# Patient Record
Sex: MALE | Race: WHITE | NOT HISPANIC OR LATINO | Employment: UNEMPLOYED | URBAN - METROPOLITAN AREA
[De-identification: names, ages, dates, MRNs, and addresses within clinical notes are randomized per-mention and may not be internally consistent; named-entity substitution may affect disease eponyms.]

---

## 2022-11-11 ENCOUNTER — HOSPITAL ENCOUNTER (EMERGENCY)
Facility: HOSPITAL | Age: 62
Discharge: HOME | End: 2022-11-11
Attending: SURGERY

## 2022-11-11 VITALS
HEART RATE: 64 BPM | RESPIRATION RATE: 18 BRPM | SYSTOLIC BLOOD PRESSURE: 180 MMHG | TEMPERATURE: 97 F | DIASTOLIC BLOOD PRESSURE: 99 MMHG | WEIGHT: 158.75 LBS | OXYGEN SATURATION: 99 %

## 2022-11-11 DIAGNOSIS — M79.644 FINGER PAIN, RIGHT: ICD-10-CM

## 2022-11-11 DIAGNOSIS — I10 PRIMARY HYPERTENSION: ICD-10-CM

## 2022-11-11 DIAGNOSIS — I73.01 RAYNAUD'S PHENOMENON WITH GANGRENE: Primary | ICD-10-CM

## 2022-11-11 DIAGNOSIS — M79.641 HAND PAIN, RIGHT: ICD-10-CM

## 2022-11-11 LAB
ALBUMIN SERPL BCP-MCNC: 3.7 G/DL (ref 3.5–5.2)
ALP SERPL-CCNC: 64 U/L (ref 55–135)
ALT SERPL W/O P-5'-P-CCNC: 10 U/L (ref 10–44)
ANION GAP SERPL CALC-SCNC: 8 MMOL/L (ref 8–16)
AST SERPL-CCNC: 16 U/L (ref 10–40)
BASOPHILS # BLD AUTO: 0.04 K/UL (ref 0–0.2)
BASOPHILS NFR BLD: 0.5 % (ref 0–1.9)
BILIRUB SERPL-MCNC: 0.4 MG/DL (ref 0.1–1)
BUN SERPL-MCNC: 32 MG/DL (ref 8–23)
CALCIUM SERPL-MCNC: 9.3 MG/DL (ref 8.7–10.5)
CHLORIDE SERPL-SCNC: 106 MMOL/L (ref 95–110)
CO2 SERPL-SCNC: 23 MMOL/L (ref 23–29)
CREAT SERPL-MCNC: 1.5 MG/DL (ref 0.5–1.4)
DIFFERENTIAL METHOD: ABNORMAL
EOSINOPHIL # BLD AUTO: 0.2 K/UL (ref 0–0.5)
EOSINOPHIL NFR BLD: 2.6 % (ref 0–8)
ERYTHROCYTE [DISTWIDTH] IN BLOOD BY AUTOMATED COUNT: 15.5 % (ref 11.5–14.5)
EST. GFR  (NO RACE VARIABLE): 52 ML/MIN/1.73 M^2
GLUCOSE SERPL-MCNC: 101 MG/DL (ref 70–110)
HCT VFR BLD AUTO: 41.4 % (ref 40–54)
HGB BLD-MCNC: 14.2 G/DL (ref 14–18)
IMM GRANULOCYTES # BLD AUTO: 0.03 K/UL (ref 0–0.04)
IMM GRANULOCYTES NFR BLD AUTO: 0.4 % (ref 0–0.5)
LYMPHOCYTES # BLD AUTO: 1.9 K/UL (ref 1–4.8)
LYMPHOCYTES NFR BLD: 23.2 % (ref 18–48)
MCH RBC QN AUTO: 30.1 PG (ref 27–31)
MCHC RBC AUTO-ENTMCNC: 34.3 G/DL (ref 32–36)
MCV RBC AUTO: 88 FL (ref 82–98)
MONOCYTES # BLD AUTO: 0.9 K/UL (ref 0.3–1)
MONOCYTES NFR BLD: 11.1 % (ref 4–15)
NEUTROPHILS # BLD AUTO: 5.1 K/UL (ref 1.8–7.7)
NEUTROPHILS NFR BLD: 62.2 % (ref 38–73)
NRBC BLD-RTO: 0 /100 WBC
PLATELET # BLD AUTO: 394 K/UL (ref 150–450)
PMV BLD AUTO: 9.1 FL (ref 9.2–12.9)
POTASSIUM SERPL-SCNC: 4.1 MMOL/L (ref 3.5–5.1)
PROT SERPL-MCNC: 7.3 G/DL (ref 6–8.4)
RBC # BLD AUTO: 4.71 M/UL (ref 4.6–6.2)
SODIUM SERPL-SCNC: 137 MMOL/L (ref 136–145)
TROPONIN I SERPL DL<=0.01 NG/ML-MCNC: 0.01 NG/ML (ref 0–0.03)
WBC # BLD AUTO: 8.14 K/UL (ref 3.9–12.7)

## 2022-11-11 PROCEDURE — 84484 ASSAY OF TROPONIN QUANT: CPT

## 2022-11-11 PROCEDURE — 99284 EMERGENCY DEPT VISIT MOD MDM: CPT | Mod: 25

## 2022-11-11 PROCEDURE — 96374 THER/PROPH/DIAG INJ IV PUSH: CPT

## 2022-11-11 PROCEDURE — 63600175 PHARM REV CODE 636 W HCPCS

## 2022-11-11 PROCEDURE — 80053 COMPREHEN METABOLIC PANEL: CPT

## 2022-11-11 PROCEDURE — 96375 TX/PRO/DX INJ NEW DRUG ADDON: CPT

## 2022-11-11 PROCEDURE — 25000003 PHARM REV CODE 250

## 2022-11-11 PROCEDURE — 85025 COMPLETE CBC W/AUTO DIFF WBC: CPT

## 2022-11-11 RX ORDER — PANTOPRAZOLE SODIUM 40 MG/1
40 TABLET, DELAYED RELEASE ORAL DAILY
Qty: 30 TABLET | Refills: 1 | Status: SHIPPED | OUTPATIENT
Start: 2022-11-11 | End: 2023-01-10

## 2022-11-11 RX ORDER — MORPHINE SULFATE 2 MG/ML
4 INJECTION, SOLUTION INTRAMUSCULAR; INTRAVENOUS
Status: COMPLETED | OUTPATIENT
Start: 2022-11-11 | End: 2022-11-11

## 2022-11-11 RX ORDER — CLONIDINE HYDROCHLORIDE 0.1 MG/1
0.2 TABLET ORAL
Status: COMPLETED | OUTPATIENT
Start: 2022-11-11 | End: 2022-11-11

## 2022-11-11 RX ORDER — AMLODIPINE BESYLATE 10 MG/1
10 TABLET ORAL
Status: COMPLETED | OUTPATIENT
Start: 2022-11-11 | End: 2022-11-11

## 2022-11-11 RX ORDER — ONDANSETRON 2 MG/ML
4 INJECTION INTRAMUSCULAR; INTRAVENOUS
Status: COMPLETED | OUTPATIENT
Start: 2022-11-11 | End: 2022-11-11

## 2022-11-11 RX ORDER — TRAMADOL HYDROCHLORIDE 50 MG/1
50 TABLET ORAL EVERY 12 HOURS PRN
Qty: 14 TABLET | Refills: 0 | Status: SHIPPED | OUTPATIENT
Start: 2022-11-11 | End: 2022-11-18

## 2022-11-11 RX ORDER — AMLODIPINE BESYLATE 10 MG/1
10 TABLET ORAL DAILY
Qty: 30 TABLET | Refills: 1 | Status: SHIPPED | OUTPATIENT
Start: 2022-11-11 | End: 2023-01-10

## 2022-11-11 RX ORDER — FAMOTIDINE 20 MG/1
20 TABLET, FILM COATED ORAL
Status: COMPLETED | OUTPATIENT
Start: 2022-11-11 | End: 2022-11-11

## 2022-11-11 RX ADMIN — CLONIDINE HYDROCHLORIDE 0.2 MG: 0.1 TABLET ORAL at 01:11

## 2022-11-11 RX ADMIN — FAMOTIDINE 20 MG: 20 TABLET ORAL at 12:11

## 2022-11-11 RX ADMIN — ONDANSETRON HYDROCHLORIDE 4 MG: 2 INJECTION, SOLUTION INTRAMUSCULAR; INTRAVENOUS at 01:11

## 2022-11-11 RX ADMIN — MORPHINE SULFATE 4 MG: 2 INJECTION, SOLUTION INTRAMUSCULAR; INTRAVENOUS at 01:11

## 2022-11-11 RX ADMIN — AMLODIPINE BESYLATE 10 MG: 10 TABLET ORAL at 12:11

## 2022-11-11 NOTE — ED PROVIDER NOTES
Encounter Date: 11/11/2022       History     Chief Complaint   Patient presents with    Finger Pain     This note is dictated on M*Modal word recognition program.  There are word recognition mistakes and grammatical errors that are occasionally missed on review.       Jefe Eddy is a 62 y.o. male presents to ER today with complaints of bilateral hand and finger pain.  This pain has been ongoing for several months now.  Patient reports several his fingertips turned black and have sores on them.  Patient denies purulent drainage or foul smell from sores to fingertips.  Patient reports he has not been to her doctor and least 10 years.  Patient reports he is a smoker.  Patient denies taking any prescription medication at home.  Patient rates pain 2 fingers at 10/10 currently.  Patient reports he has been taking a lot of aspirin at home and now his stomach is upset.    The history is provided by the patient.   Review of patient's allergies indicates:   Allergen Reactions    Codeine Itching     Past Medical History:   Diagnosis Date    Hypertension     Multiple pelvic fractures      No past surgical history on file.  No family history on file.  Social History     Tobacco Use    Smoking status: Every Day     Packs/day: 1.00     Types: Cigarettes   Substance Use Topics    Alcohol use: No     Review of Systems   Constitutional: Negative.    HENT: Negative.     Eyes: Negative.    Respiratory: Negative.     Cardiovascular: Negative.    Gastrointestinal: Negative.    Musculoskeletal:  Positive for arthralgias.        Patient reports bilateral hand and finger pain with sores to multiple fingers for the last several months.   Skin:  Positive for color change and wound.   Allergic/Immunologic: Negative.    Neurological: Negative.    Hematological: Negative.    Psychiatric/Behavioral: Negative.       Physical Exam     Initial Vitals [11/11/22 1142]   BP Pulse Resp Temp SpO2   (!) 188/110 78 18 97.3 °F (36.3 °C) 100 %      MAP        --         Physical Exam    Constitutional: He appears well-developed and well-nourished. He is not diaphoretic. No distress.   HENT:   Right Ear: External ear normal.   Left Ear: External ear normal.   Mouth/Throat: Oropharynx is clear and moist.   Eyes: Pupils are equal, round, and reactive to light. Right eye exhibits no discharge. Left eye exhibits no discharge. No scleral icterus.   Neck: Neck supple.   Normal range of motion.  Cardiovascular:  Normal rate, regular rhythm and normal heart sounds.     Exam reveals no friction rub.       No murmur heard.  Pulmonary/Chest: Breath sounds normal. No respiratory distress. He has no wheezes. He has no rhonchi. He has no rales.   Abdominal: Abdomen is soft. Bowel sounds are normal. There is no abdominal tenderness.   Musculoskeletal:         General: Tenderness and edema present.      Cervical back: Normal range of motion and neck supple.     Neurological: He is alert and oriented to person, place, and time. He displays normal reflexes. No cranial nerve deficit or sensory deficit. GCS score is 15. GCS eye subscore is 4. GCS verbal subscore is 5. GCS motor subscore is 6.   Skin: Skin is warm. Capillary refill takes more than 3 seconds. No erythema.   Capillary refill increased to all 10 fingertips on examination today.  Bilateral radial pulses 2+.  Please see picture in chart of bilateral fingers.           ED Course   Procedures  Labs Reviewed   CBC W/ AUTO DIFFERENTIAL - Abnormal; Notable for the following components:       Result Value    RDW 15.5 (*)     MPV 9.1 (*)     All other components within normal limits   COMPREHENSIVE METABOLIC PANEL - Abnormal; Notable for the following components:    BUN 32 (*)     Creatinine 1.5 (*)     eGFR 52 (*)     All other components within normal limits   TROPONIN I          Imaging Results              X-Ray Hand 3 View Bilateral (Final result)  Result time 11/11/22 12:35:38   Procedure changed from X-Ray Hand 2 View  Left     Final result by Alexus Hernandez MD (11/11/22 12:35:38)                   Impression:      moderate degenerative changes throughout as above      Electronically signed by: Alexus Hernandez MD  Date:    11/11/2022  Time:    12:35               Narrative:    EXAMINATION:  XR HAND COMPLETE 3 VIEWS BILATERAL    CLINICAL HISTORY:  Pain in right finger(s)bilateral hand pain;    COMPARISON:  None    FINDINGS:  Degenerative changes of the interphalangeal joints.  There is some mild flattening of the distal 3rd metacarpal heads.  Degenerative changes of the interphalangeal joints.                                       Medications   amLODIPine tablet 10 mg (10 mg Oral Given 11/11/22 1204)   famotidine tablet 20 mg (20 mg Oral Given 11/11/22 1204)   cloNIDine tablet 0.2 mg (0.2 mg Oral Given 11/11/22 1303)   morphine injection 4 mg (4 mg Intravenous Given 11/11/22 1303)   ondansetron injection 4 mg (4 mg Intravenous Given 11/11/22 1303)     Medical Decision Making:   Differential Diagnosis:   Hypertension, no primary health care, gastritis, peptic ulcer, Raynaud's phenomena with gangrene, cigarette smoker  Clinical Tests:   Lab Tests: Ordered and Reviewed  Radiological Study: Ordered and Reviewed  ED Management:  Patient has Raynaud's phenomena with gangrene present due to severe decreased peripheral circulation to all 10 fingers.  This is most likely related to his extensive smoking history and uncontrolled hypertension.  I will place patient on Norvasc 10 mg daily to act as vasodilation to help improve circulation to fingertips.  Patient has been referred to primary care and vascular medicine to be evaluated for Raynaud's phenomenon with gangrene.  I have provided patient with written and verbal information in order to help him obtain ride to his doctor's appointment.  I will place patient on Protonix to help protect his stomach as he has been taking a lot of aspirin to control the pain in his hands.  Patient stable at  time of discharge and is in no acute distress.  I have treated patient's blood pressure here with Norvasc 10 and clonidine.  Patient instructed return to ER immediately with any worsening or concerning symptoms.  Patient extensively counseled on the importance of quitting smoking and following up with healthcare providers.                        Clinical Impression:   Final diagnoses:  [I10] Primary hypertension  [M79.644] Finger pain, right  [M79.641] Hand pain, right  [I73.01] Raynaud's phenomenon with gangrene (Primary)      ED Disposition Condition    Discharge Stable          ED Prescriptions       Medication Sig Dispense Start Date End Date Auth. Provider    amLODIPine (NORVASC) 10 MG tablet Take 1 tablet (10 mg total) by mouth once daily. 30 tablet 11/11/2022 1/10/2023 Micky Matute NP    pantoprazole (PROTONIX) 40 MG tablet Take 1 tablet (40 mg total) by mouth once daily. 30 tablet 11/11/2022 1/10/2023 Micky Matute NP    traMADoL (ULTRAM) 50 mg tablet Take 1 tablet (50 mg total) by mouth every 12 (twelve) hours as needed for Pain. 14 tablet 11/11/2022 11/18/2022 Micky Matute NP          Follow-up Information       Follow up With Specialties Details Why Contact Info    Reji Mireles MD Family Medicine   [address] [facility]  [place] [address]  [phone]      [facility], Psychiatry, Psychology, Physical Therapy, Occupational Therapy, Speech Pathology In 2 days  [address] [facility]  [place] [address]  [phone]               Micky Matute NP  11/11/22 1602

## 2022-11-11 NOTE — ED TRIAGE NOTES
62 y.o. male presents to ER ED 01/ED 01B   Chief Complaint   Patient presents with    Finger Pain   .   Pt reports pain and swelling to fingers mostly to left index finger and right thumb and middle finger for a month

## 2023-12-13 ENCOUNTER — HOSPITAL ENCOUNTER (EMERGENCY)
Facility: HOSPITAL | Age: 63
Discharge: HOME | End: 2023-12-13
Attending: STUDENT IN AN ORGANIZED HEALTH CARE EDUCATION/TRAINING PROGRAM

## 2023-12-13 VITALS
WEIGHT: 152.31 LBS | BODY MASS INDEX: 21.81 KG/M2 | HEIGHT: 70 IN | SYSTOLIC BLOOD PRESSURE: 210 MMHG | DIASTOLIC BLOOD PRESSURE: 117 MMHG | RESPIRATION RATE: 16 BRPM | OXYGEN SATURATION: 100 % | TEMPERATURE: 97 F | HEART RATE: 68 BPM

## 2023-12-13 DIAGNOSIS — L03.011 CELLULITIS OF FINGER OF RIGHT HAND: ICD-10-CM

## 2023-12-13 DIAGNOSIS — S61.200A OPEN WOUND OF RIGHT INDEX FINGER: Primary | ICD-10-CM

## 2023-12-13 LAB
ALBUMIN SERPL BCP-MCNC: 3.5 G/DL (ref 3.5–5.2)
ALP SERPL-CCNC: 61 U/L (ref 55–135)
ALT SERPL W/O P-5'-P-CCNC: 10 U/L (ref 10–44)
ANION GAP SERPL CALC-SCNC: 8 MMOL/L (ref 8–16)
AST SERPL-CCNC: 16 U/L (ref 10–40)
BASOPHILS # BLD AUTO: 0.04 K/UL (ref 0–0.2)
BASOPHILS NFR BLD: 0.5 % (ref 0–1.9)
BILIRUB SERPL-MCNC: 0.3 MG/DL (ref 0.1–1)
BUN SERPL-MCNC: 45 MG/DL (ref 8–23)
CALCIUM SERPL-MCNC: 9.4 MG/DL (ref 8.7–10.5)
CHLORIDE SERPL-SCNC: 108 MMOL/L (ref 95–110)
CO2 SERPL-SCNC: 21 MMOL/L (ref 23–29)
CREAT SERPL-MCNC: 2.1 MG/DL (ref 0.5–1.4)
DIFFERENTIAL METHOD: ABNORMAL
EOSINOPHIL # BLD AUTO: 0.2 K/UL (ref 0–0.5)
EOSINOPHIL NFR BLD: 2.6 % (ref 0–8)
ERYTHROCYTE [DISTWIDTH] IN BLOOD BY AUTOMATED COUNT: 14.7 % (ref 11.5–14.5)
ERYTHROCYTE [SEDIMENTATION RATE] IN BLOOD BY WESTERGREN METHOD: 5 MM/HR (ref 0–10)
EST. GFR  (NO RACE VARIABLE): 35 ML/MIN/1.73 M^2
ESTIMATED AVG GLUCOSE: 105 MG/DL (ref 68–131)
GLUCOSE SERPL-MCNC: 119 MG/DL (ref 70–110)
HBA1C MFR BLD: 5.3 % (ref 4–5.6)
HCT VFR BLD AUTO: 47.2 % (ref 40–54)
HGB BLD-MCNC: 15.3 G/DL (ref 14–18)
IMM GRANULOCYTES # BLD AUTO: 0.05 K/UL (ref 0–0.04)
IMM GRANULOCYTES NFR BLD AUTO: 0.6 % (ref 0–0.5)
LACTATE SERPL-SCNC: 1.4 MMOL/L (ref 0.5–2.2)
LYMPHOCYTES # BLD AUTO: 2.1 K/UL (ref 1–4.8)
LYMPHOCYTES NFR BLD: 25.5 % (ref 18–48)
MCH RBC QN AUTO: 30.4 PG (ref 27–31)
MCHC RBC AUTO-ENTMCNC: 32.4 G/DL (ref 32–36)
MCV RBC AUTO: 94 FL (ref 82–98)
MONOCYTES # BLD AUTO: 0.5 K/UL (ref 0.3–1)
MONOCYTES NFR BLD: 6.7 % (ref 4–15)
NEUTROPHILS # BLD AUTO: 5.2 K/UL (ref 1.8–7.7)
NEUTROPHILS NFR BLD: 64.1 % (ref 38–73)
NRBC BLD-RTO: 0 /100 WBC
PLATELET # BLD AUTO: 473 K/UL (ref 150–450)
PMV BLD AUTO: 9.2 FL (ref 9.2–12.9)
POTASSIUM SERPL-SCNC: 3.9 MMOL/L (ref 3.5–5.1)
PROT SERPL-MCNC: 7.1 G/DL (ref 6–8.4)
RBC # BLD AUTO: 5.03 M/UL (ref 4.6–6.2)
SODIUM SERPL-SCNC: 137 MMOL/L (ref 136–145)
WBC # BLD AUTO: 8.04 K/UL (ref 3.9–12.7)

## 2023-12-13 PROCEDURE — 63600175 PHARM REV CODE 636 W HCPCS: Performed by: EMERGENCY MEDICINE

## 2023-12-13 PROCEDURE — 85651 RBC SED RATE NONAUTOMATED: CPT | Performed by: STUDENT IN AN ORGANIZED HEALTH CARE EDUCATION/TRAINING PROGRAM

## 2023-12-13 PROCEDURE — 87186 SC STD MICRODIL/AGAR DIL: CPT | Performed by: STUDENT IN AN ORGANIZED HEALTH CARE EDUCATION/TRAINING PROGRAM

## 2023-12-13 PROCEDURE — 96365 THER/PROPH/DIAG IV INF INIT: CPT

## 2023-12-13 PROCEDURE — 85025 COMPLETE CBC W/AUTO DIFF WBC: CPT | Performed by: STUDENT IN AN ORGANIZED HEALTH CARE EDUCATION/TRAINING PROGRAM

## 2023-12-13 PROCEDURE — 96368 THER/DIAG CONCURRENT INF: CPT

## 2023-12-13 PROCEDURE — 87040 BLOOD CULTURE FOR BACTERIA: CPT | Mod: 59 | Performed by: STUDENT IN AN ORGANIZED HEALTH CARE EDUCATION/TRAINING PROGRAM

## 2023-12-13 PROCEDURE — 87075 CULTR BACTERIA EXCEPT BLOOD: CPT | Performed by: STUDENT IN AN ORGANIZED HEALTH CARE EDUCATION/TRAINING PROGRAM

## 2023-12-13 PROCEDURE — 63600175 PHARM REV CODE 636 W HCPCS: Performed by: STUDENT IN AN ORGANIZED HEALTH CARE EDUCATION/TRAINING PROGRAM

## 2023-12-13 PROCEDURE — 99284 EMERGENCY DEPT VISIT MOD MDM: CPT | Mod: 25

## 2023-12-13 PROCEDURE — 96361 HYDRATE IV INFUSION ADD-ON: CPT

## 2023-12-13 PROCEDURE — 87070 CULTURE OTHR SPECIMN AEROBIC: CPT | Performed by: STUDENT IN AN ORGANIZED HEALTH CARE EDUCATION/TRAINING PROGRAM

## 2023-12-13 PROCEDURE — 96366 THER/PROPH/DIAG IV INF ADDON: CPT

## 2023-12-13 PROCEDURE — 80053 COMPREHEN METABOLIC PANEL: CPT | Performed by: STUDENT IN AN ORGANIZED HEALTH CARE EDUCATION/TRAINING PROGRAM

## 2023-12-13 PROCEDURE — 25000003 PHARM REV CODE 250: Performed by: STUDENT IN AN ORGANIZED HEALTH CARE EDUCATION/TRAINING PROGRAM

## 2023-12-13 PROCEDURE — 87077 CULTURE AEROBIC IDENTIFY: CPT | Performed by: STUDENT IN AN ORGANIZED HEALTH CARE EDUCATION/TRAINING PROGRAM

## 2023-12-13 PROCEDURE — 96375 TX/PRO/DX INJ NEW DRUG ADDON: CPT

## 2023-12-13 PROCEDURE — 36415 COLL VENOUS BLD VENIPUNCTURE: CPT | Performed by: STUDENT IN AN ORGANIZED HEALTH CARE EDUCATION/TRAINING PROGRAM

## 2023-12-13 PROCEDURE — 96376 TX/PRO/DX INJ SAME DRUG ADON: CPT

## 2023-12-13 PROCEDURE — 83036 HEMOGLOBIN GLYCOSYLATED A1C: CPT | Performed by: STUDENT IN AN ORGANIZED HEALTH CARE EDUCATION/TRAINING PROGRAM

## 2023-12-13 PROCEDURE — 83605 ASSAY OF LACTIC ACID: CPT | Performed by: STUDENT IN AN ORGANIZED HEALTH CARE EDUCATION/TRAINING PROGRAM

## 2023-12-13 PROCEDURE — 90471 IMMUNIZATION ADMIN: CPT | Performed by: STUDENT IN AN ORGANIZED HEALTH CARE EDUCATION/TRAINING PROGRAM

## 2023-12-13 PROCEDURE — 87076 CULTURE ANAEROBE IDENT EACH: CPT | Performed by: STUDENT IN AN ORGANIZED HEALTH CARE EDUCATION/TRAINING PROGRAM

## 2023-12-13 PROCEDURE — 90715 TDAP VACCINE 7 YRS/> IM: CPT | Performed by: STUDENT IN AN ORGANIZED HEALTH CARE EDUCATION/TRAINING PROGRAM

## 2023-12-13 RX ORDER — MORPHINE SULFATE 2 MG/ML
4 INJECTION, SOLUTION INTRAMUSCULAR; INTRAVENOUS
Status: COMPLETED | OUTPATIENT
Start: 2023-12-13 | End: 2023-12-13

## 2023-12-13 RX ORDER — ONDANSETRON 2 MG/ML
4 INJECTION INTRAMUSCULAR; INTRAVENOUS
Status: COMPLETED | OUTPATIENT
Start: 2023-12-13 | End: 2023-12-13

## 2023-12-13 RX ORDER — MUPIROCIN 20 MG/G
1 OINTMENT TOPICAL
Status: COMPLETED | OUTPATIENT
Start: 2023-12-13 | End: 2023-12-13

## 2023-12-13 RX ORDER — LEVOFLOXACIN 500 MG/1
500 TABLET, FILM COATED ORAL EVERY OTHER DAY
Qty: 5 TABLET | Refills: 0 | Status: SHIPPED | OUTPATIENT
Start: 2023-12-13 | End: 2023-12-22

## 2023-12-13 RX ORDER — MORPHINE SULFATE 2 MG/ML
2 INJECTION, SOLUTION INTRAMUSCULAR; INTRAVENOUS
Status: COMPLETED | OUTPATIENT
Start: 2023-12-13 | End: 2023-12-13

## 2023-12-13 RX ORDER — DOXYCYCLINE 100 MG/1
100 CAPSULE ORAL 2 TIMES DAILY
Qty: 14 CAPSULE | Refills: 0 | Status: SHIPPED | OUTPATIENT
Start: 2023-12-13 | End: 2023-12-23

## 2023-12-13 RX ORDER — SODIUM CHLORIDE 9 MG/ML
1000 INJECTION, SOLUTION INTRAVENOUS CONTINUOUS
Status: DISCONTINUED | OUTPATIENT
Start: 2023-12-13 | End: 2023-12-13 | Stop reason: HOSPADM

## 2023-12-13 RX ADMIN — MORPHINE SULFATE 4 MG: 2 INJECTION, SOLUTION INTRAMUSCULAR; INTRAVENOUS at 01:12

## 2023-12-13 RX ADMIN — MORPHINE SULFATE 2 MG: 2 INJECTION, SOLUTION INTRAMUSCULAR; INTRAVENOUS at 08:12

## 2023-12-13 RX ADMIN — MORPHINE SULFATE 4 MG: 2 INJECTION, SOLUTION INTRAMUSCULAR; INTRAVENOUS at 02:12

## 2023-12-13 RX ADMIN — VANCOMYCIN HYDROCHLORIDE 1500 MG: 1.5 INJECTION, POWDER, LYOPHILIZED, FOR SOLUTION INTRAVENOUS at 02:12

## 2023-12-13 RX ADMIN — TETANUS TOXOID, REDUCED DIPHTHERIA TOXOID AND ACELLULAR PERTUSSIS VACCINE, ADSORBED 0.5 ML: 5; 2.5; 8; 8; 2.5 SUSPENSION INTRAMUSCULAR at 01:12

## 2023-12-13 RX ADMIN — SODIUM CHLORIDE 1000 ML: 9 INJECTION, SOLUTION INTRAVENOUS at 01:12

## 2023-12-13 RX ADMIN — ONDANSETRON 4 MG: 2 INJECTION INTRAMUSCULAR; INTRAVENOUS at 01:12

## 2023-12-13 RX ADMIN — MUPIROCIN 1 TUBE: 20 OINTMENT TOPICAL at 05:12

## 2023-12-13 RX ADMIN — PIPERACILLIN AND TAZOBACTAM 4.5 G: 4; .5 INJECTION, POWDER, LYOPHILIZED, FOR SOLUTION INTRAVENOUS; PARENTERAL at 01:12

## 2023-12-13 NOTE — PROGRESS NOTES
"Pharmacokinetic Initial Assessment: IV Vancomycin    Assessment/Plan:    Initiate intravenous vancomycin with loading dose of 1500 mg (20mg/kg) once with subsequent doses when random concentrations are less than 20 mcg/mL  Desired empiric serum trough concentration is 10 to 20 mcg/mL  Draw vancomycin random level on 12/14/23 at 0600 (with am labs).  Pharmacy will continue to follow and monitor vancomycin.      Please contact pharmacy at extension [phone] with any questions regarding this assessment.     Thank you for the consult,   Solange Pardo, PharmD        Patient brief summary:  Jefe Eddy is a 63 y.o. male initiated on antimicrobial therapy with IV Vancomycin for treatment of suspected bone/joint infection    Drug Allergies:   Review of patient's allergies indicates:  No Known Allergies    Actual Body Weight:   69.1kg    Renal Function:   Estimated Creatinine Clearance: 34.7 mL/min (A) (based on SCr of 2.1 mg/dL (H)).,     Dialysis Method (if applicable):  N/A    CBC (last 72 hours):  Recent Labs   Lab Result Units 12/13/23  1320   WBC K/uL 8.04   Hemoglobin g/dL 15.3   Hematocrit % 47.2   Platelets K/uL 473*   Gran % % 64.1   Lymph % % 25.5   Mono % % 6.7   Eosinophil % % 2.6   Basophil % % 0.5   Differential Method  Automated       Metabolic Panel (last 72 hours):  Recent Labs   Lab Result Units 12/13/23  1320   Sodium mmol/L 137   Potassium mmol/L 3.9   Chloride mmol/L 108   CO2 mmol/L 21*   Glucose mg/dL 119*   BUN mg/dL 45*   Creatinine mg/dL 2.1*   Albumin g/dL 3.5   Total Bilirubin mg/dL 0.3   Alkaline Phosphatase U/L 61   AST U/L 16   ALT U/L 10       Drug levels (last 3 results):  No results for input(s): "VANCOMYCINRA", "VANCORANDOM", "VANCOMYCINPE", "VANCOPEAK", "VANCOMYCINTR", "VANCOTROUGH" in the last 72 hours.    Microbiologic Results:  Microbiology Results (last 7 days)       Procedure Component Value Units Date/Time    Aerobic culture (Specify Source) **CANNOT BE ORDERED AS STAT** " [[id number]] Collected: 12/13/23 1327    Order Status: Sent Specimen: Wound from Finger, Right Hand Updated: 12/13/23 1330    Culture, Anaerobic [[id number]] Collected: 12/13/23 1327    Order Status: Sent Specimen: Wound from Finger, Right Hand Updated: 12/13/23 1330    Blood Culture #2 **CANNOT BE ORDERED STAT** [[id number]] Collected: 12/13/23 1315    Order Status: Sent Specimen: Blood Updated: 12/13/23 1323    Blood Culture #1 **CANNOT BE ORDERED STAT** [[id number]] Collected: 12/13/23 1310    Order Status: Sent Specimen: Blood Updated: 12/13/23 1323

## 2023-12-13 NOTE — ED PROVIDER NOTES
Encounter Date: 12/13/2023    This document was partially completed using speech recognition software and may contain misspellings, grammatical errors, and/or unexpected word substitutions.       History     Chief Complaint   Patient presents with    Finger Pain     63 year old male with a PMHx of HTN, smoker presents to the ED with right index finger wound. States it's been getting worse for 3 months. He doesn't have a vehicle or a ride so has been self-treating at home by keeping it warm and covered. Was seen Nov 2022 for suspected Raynaud's and never followed up. States that over the last 3 months, it will swell and ulcerate/pop and repeat. It became foul smelling so he finally found a ride to go to the ED. He is right-handed.        Review of patient's allergies indicates:  No Known Allergies  Past Medical History:   Diagnosis Date    Hypertension     Multiple pelvic fractures      No past surgical history on file.  No family history on file.  Social History     Tobacco Use    Smoking status: Every Day     Current packs/day: 1.00     Types: Cigarettes   Substance Use Topics    Alcohol use: No     Review of Systems   Constitutional:  Negative for chills and fever.   HENT:  Negative for congestion, rhinorrhea and sneezing.    Eyes:  Negative for discharge and redness.   Respiratory:  Negative for cough and shortness of breath.    Cardiovascular:  Negative for chest pain and palpitations.   Gastrointestinal:  Negative for abdominal pain, diarrhea and vomiting.   Genitourinary:  Negative for difficulty urinating, flank pain and urgency.   Musculoskeletal:  Negative for back pain and neck pain.   Skin:  Positive for wound. Negative for rash.   Neurological:  Negative for weakness, numbness and headaches.                       Physical Exam     Initial Vitals   BP Pulse Resp Temp SpO2   12/13/23 1241 12/13/23 1241 12/13/23 1241 12/13/23 1241 12/13/23 1254   (!) 207/121 94 18 96.8 °F (36 °C) 100 %      MAP       --                 Physical Exam    Nursing note and vitals reviewed.  Constitutional: He appears well-developed. He is not diaphoretic. No distress.   HENT:   Head: Normocephalic and atraumatic.   Right Ear: External ear normal.   Left Ear: External ear normal.   Nose: Nose normal.   Eyes: Conjunctivae are normal. Right eye exhibits no discharge. Left eye exhibits no discharge. No scleral icterus.   Cardiovascular:  Normal rate and regular rhythm.           Pulmonary/Chest: Breath sounds normal. No stridor. No respiratory distress. He has no wheezes. He has no rhonchi. He has no rales.   Abdominal: Abdomen is soft. He exhibits no distension. There is no abdominal tenderness. There is no guarding.   Musculoskeletal:         General: No edema.      Right hand: Swelling, tenderness and bony tenderness present. Decreased range of motion. Decreased sensation.      Comments: Right index distal finger foul smelling, painful wound    Decreased ROM at the DIP joint of the right index finger     Neurological: He is alert and oriented to person, place, and time. GCS score is 15. GCS eye subscore is 4. GCS verbal subscore is 5. GCS motor subscore is 6.   Skin: Skin is warm and dry. Capillary refill takes less than 2 seconds.   Psychiatric: He has a normal mood and affect.         ED Course   Procedures  Labs Reviewed   CBC W/ AUTO DIFFERENTIAL - Abnormal; Notable for the following components:       Result Value    RDW 14.7 (*)     Platelets 473 (*)     Immature Granulocytes 0.6 (*)     Immature Grans (Abs) 0.05 (*)     All other components within normal limits   COMPREHENSIVE METABOLIC PANEL - Abnormal; Notable for the following components:    CO2 21 (*)     Glucose 119 (*)     BUN 45 (*)     Creatinine 2.1 (*)     eGFR 35 (*)     All other components within normal limits   CULTURE, BLOOD   CULTURE, BLOOD   CULTURE, AEROBIC  (SPECIFY SOURCE)   CULTURE, ANAEROBIC   SEDIMENTATION RATE   LACTIC ACID, PLASMA   HEMOGLOBIN A1C           Imaging Results              X-Ray Hand 3 view Right (Final result)  Result time 12/13/23 13:22:47      Final result by Vane Solomon MD (12/13/23 13:22:47)                   Impression:      As above.      Electronically signed by: Vane Solomon MD  Date:    12/13/2023  Time:    13:22               Narrative:    EXAMINATION:  XR HAND COMPLETE 3 VIEW RIGHT    CLINICAL HISTORY:  index finger infection;    TECHNIQUE:  Three views of the right hand    COMPARISON:  11/11/2022    FINDINGS:  There is interphalangeal joint space narrowing.  There is associated marginal osteophytosis.  No fracture or dislocation.  No osseous erosions.                                       Medications   vancomycin - pharmacy to dose ( Intravenous Random Level Due 12/14/23 0600)   0.9%  NaCl infusion (1,000 mLs Intravenous New Bag 12/13/23 1312)   piperacillin-tazobactam (ZOSYN) 4.5 g in dextrose 5 % in water (D5W) 100 mL IVPB (MB+) (has no administration in time range)   Tdap (BOOSTRIX) vaccine injection 0.5 mL (0.5 mLs Intramuscular Given 12/13/23 1319)   piperacillin-tazobactam (ZOSYN) 4.5 g in dextrose 5 % in water (D5W) 100 mL IVPB (MB+) (0 g Intravenous Stopped 12/13/23 1404)   morphine injection 4 mg (4 mg Intravenous Given 12/13/23 1313)   ondansetron injection 4 mg (4 mg Intravenous Given 12/13/23 1313)   vancomycin 1,500 mg in dextrose 5 % (D5W) 250 mL IVPB (Vial-Mate) (0 mg Intravenous Stopped 12/13/23 1643)   morphine injection 4 mg (4 mg Intravenous Given 12/13/23 1452)     Medical Decision Making  Ddx: gangrene, osteomyelitis, flexor tenosynovitis, sepsis, open fracture    Based on the patient's evaluation - patient will need evaluation by hand surgery for foul smelling right index finger wound. It is foul smelling with concerns for wet gangrene. Will need hand surgery for possible debridement or even amputation. No fever, no leukocytosis, no osteolytic lesions on XR. Bcx drawn, Wound Cx done, lactic negative. Vanc,  zosyn ordered. Will reach out to transfer center for assistance.    4:56 PM  Transfer center has been assisting with transfer for hand surgery. He is on the waitlist for transfer.    Amount and/or Complexity of Data Reviewed  Labs: ordered.  Radiology: ordered.    Risk  Prescription drug management.                                      Clinical Impression:  Final diagnoses:  [S61.200A] Open wound of right index finger (Primary)          ED Disposition Condition    Transfer to Another Facility Stable                Edmond Nam DO  12/13/23 [id number]

## 2023-12-13 NOTE — ED PROVIDER NOTES
"Encounter Date: 12/13/2023       History   No chief complaint on file.    This note is dictated on M*Modal word recognition program.  There are word recognition mistakes and grammatical errors that are occasionally missed on review.     Jefe Eddy is a 63 y.o. male     The history is provided by the patient.     Review of patient's allergies indicates:  No Known Allergies  Past Medical History:   Diagnosis Date    Hypertension     Multiple pelvic fractures      No past surgical history on file.  No family history on file.  Social History     Tobacco Use    Smoking status: Every Day     Current packs/day: 1.00     Types: Cigarettes   Substance Use Topics    Alcohol use: No     Review of Systems    Physical Exam     Initial Vitals   BP Pulse Resp Temp SpO2   -- -- -- -- --      MAP       --         Physical Exam    ED Course   Procedures  Labs Reviewed - No data to display       Imaging Results    None          Medications - No data to display  Medical Decision Making                                    Clinical Impression:   ***Please document a Clinical Impression and click the "Refresh" button to refresh your note and automatically pull in before signing.***           "

## 2023-12-13 NOTE — ED NOTES
Called to patients room. Pt agitated and refusing to wear cardiac/VS monitoring. Pt updated on plan of care in detail and pt is more agreeable. Pt requesting dinner tray. MD okay with pt eating, food tray ordered.

## 2023-12-13 NOTE — ED TRIAGE NOTES
63 y.o. male presents to ER ED 04/ED 04   Chief Complaint   Patient presents with    Finger Pain   .   C/o wound and pain to right index finger for two weeks, reports having bad circulation to fingers

## 2023-12-14 NOTE — ED PROVIDER NOTES
Encounter Date: 12/13/2023       History     Chief Complaint   Patient presents with    Finger Pain     HPI  Review of patient's allergies indicates:  No Known Allergies  Past Medical History:   Diagnosis Date    Hypertension     Multiple pelvic fractures      No past surgical history on file.  No family history on file.  Social History     Tobacco Use    Smoking status: Every Day     Current packs/day: 1.00     Types: Cigarettes   Substance Use Topics    Alcohol use: No     Review of Systems    Physical Exam     Initial Vitals   BP Pulse Resp Temp SpO2   12/13/23 1241 12/13/23 1241 12/13/23 1241 12/13/23 1241 12/13/23 1254   (!) 207/121 94 18 96.8 °F (36 °C) 100 %      MAP       --                Physical Exam    ED Course   Procedures  Labs Reviewed   CBC W/ AUTO DIFFERENTIAL - Abnormal; Notable for the following components:       Result Value    RDW 14.7 (*)     Platelets 473 (*)     Immature Granulocytes 0.6 (*)     Immature Grans (Abs) 0.05 (*)     All other components within normal limits   COMPREHENSIVE METABOLIC PANEL - Abnormal; Notable for the following components:    CO2 21 (*)     Glucose 119 (*)     BUN 45 (*)     Creatinine 2.1 (*)     eGFR 35 (*)     All other components within normal limits   CULTURE, BLOOD   CULTURE, BLOOD   CULTURE, AEROBIC  (SPECIFY SOURCE)   CULTURE, ANAEROBIC   SEDIMENTATION RATE   LACTIC ACID, PLASMA   HEMOGLOBIN A1C          Imaging Results              X-Ray Hand 3 view Right (Final result)  Result time 12/13/23 13:22:47      Final result by Vane Solomon MD (12/13/23 13:22:47)                   Impression:      As above.      Electronically signed by: Vane Solomon MD  Date:    12/13/2023  Time:    13:22               Narrative:    EXAMINATION:  XR HAND COMPLETE 3 VIEW RIGHT    CLINICAL HISTORY:  index finger infection;    TECHNIQUE:  Three views of the right hand    COMPARISON:  11/11/2022    FINDINGS:  There is interphalangeal joint space narrowing.  There is  associated marginal osteophytosis.  No fracture or dislocation.  No osseous erosions.                                       Medications   vancomycin - pharmacy to dose ( Intravenous Random Level Due 12/14/23 0600)   0.9%  NaCl infusion (0 mLs Intravenous Stopped 12/13/23 1939)   piperacillin-tazobactam (ZOSYN) 4.5 g in dextrose 5 % in water (D5W) 100 mL IVPB (MB+) (has no administration in time range)   Tdap (BOOSTRIX) vaccine injection 0.5 mL (0.5 mLs Intramuscular Given 12/13/23 1319)   piperacillin-tazobactam (ZOSYN) 4.5 g in dextrose 5 % in water (D5W) 100 mL IVPB (MB+) (0 g Intravenous Stopped 12/13/23 1404)   morphine injection 4 mg (4 mg Intravenous Given 12/13/23 1313)   ondansetron injection 4 mg (4 mg Intravenous Given 12/13/23 1313)   vancomycin 1,500 mg in dextrose 5 % (D5W) 250 mL IVPB (Vial-Mate) (0 mg Intravenous Stopped 12/13/23 1643)   morphine injection 4 mg (4 mg Intravenous Given 12/13/23 1452)   mupirocin 2 % ointment 1 Tube (1 Tube Topical (Top) Given 12/13/23 1745)     Medical Decision Making  Amount and/or Complexity of Data Reviewed  External Data Reviewed: labs.  Labs: ordered. Decision-making details documented in ED Course.  Radiology: ordered and independent interpretation performed.    Risk  Prescription drug management.               ED Course as of 12/1959   Wed Dec 13, 2023   1929 I spoke to hand surgeon Dr. Harrell  Advised to send the patient home with antibiotics doxycycline and Levaquin and the patient will be seen in the office and possible outpatient surgery .  His office will call the patient to ensure follow-up [KK]      ED Course User Index  [KK] Maria Ines Dorado MD                           Clinical Impression:  Final diagnoses:  [S61.200A] Open wound of right index finger (Primary)  [L03.011] Cellulitis of finger of right hand          ED Disposition Condition    Discharge Stable          ED Prescriptions       Medication Sig Dispense Start Date End Date Auth. Provider     doxycycline (VIBRAMYCIN) 100 MG Cap Take 1 capsule (100 mg total) by mouth 2 (two) times daily. for 10 days 14 capsule 12/13/2023 12/23/2023 Maria Ines Dorado MD    levoFLOXacin (LEVAQUIN) 500 MG tablet Take 1 tablet (500 mg total) by mouth every other day. for 5 doses 5 tablet 12/13/2023 12/22/2023 Maria Ines Dorado MD          Follow-up Information       Follow up With Specialties Details Why Contact Info    Kareem Harrell MD Orthopedic Surgery In 1 week  [address] [facility]  [address]  [facility] [address]  [phone]               Maria Ines Dorado MD  12/13/23 2000

## 2023-12-16 LAB — BACTERIA SPEC AEROBE CULT: ABNORMAL

## 2023-12-18 LAB
BACTERIA BLD CULT: NORMAL
BACTERIA BLD CULT: NORMAL
BACTERIA SPEC ANAEROBE CULT: ABNORMAL
BACTERIA SPEC ANAEROBE CULT: ABNORMAL

## 2024-04-14 ENCOUNTER — HOSPITAL ENCOUNTER (EMERGENCY)
Facility: HOSPITAL | Age: 64
Discharge: ANOTHER HOSPITAL | End: 2024-04-14
Attending: SURGERY

## 2024-04-14 VITALS
RESPIRATION RATE: 24 BRPM | HEART RATE: 103 BPM | DIASTOLIC BLOOD PRESSURE: 89 MMHG | OXYGEN SATURATION: 97 % | SYSTOLIC BLOOD PRESSURE: 189 MMHG | WEIGHT: 160.06 LBS | TEMPERATURE: 99 F | BODY MASS INDEX: 22.97 KG/M2

## 2024-04-14 DIAGNOSIS — I48.91 ATRIAL FIBRILLATION WITH RVR: ICD-10-CM

## 2024-04-14 DIAGNOSIS — J18.9 PNEUMONIA OF LEFT LUNG DUE TO INFECTIOUS ORGANISM, UNSPECIFIED PART OF LUNG: ICD-10-CM

## 2024-04-14 DIAGNOSIS — R09.02 HYPOXIA: ICD-10-CM

## 2024-04-14 DIAGNOSIS — R07.9 CHEST PAIN: Primary | ICD-10-CM

## 2024-04-14 PROBLEM — K92.2 GI BLEED: Status: ACTIVE | Noted: 2024-04-14

## 2024-04-14 PROBLEM — J98.4 CAVITARY LESION OF LUNG: Status: ACTIVE | Noted: 2024-04-14

## 2024-04-14 PROBLEM — D75.839 THROMBOCYTOSIS: Status: ACTIVE | Noted: 2024-04-14

## 2024-04-14 PROBLEM — E87.6 HYPOKALEMIA: Status: ACTIVE | Noted: 2024-04-14

## 2024-04-14 PROBLEM — D50.9 IRON DEFICIENCY ANEMIA: Status: ACTIVE | Noted: 2024-04-14

## 2024-04-14 PROBLEM — R04.2 HEMOPTYSIS: Status: ACTIVE | Noted: 2024-04-14

## 2024-04-14 PROBLEM — Z72.0 TOBACCO ABUSE: Status: ACTIVE | Noted: 2024-04-14

## 2024-04-14 LAB
ALBUMIN SERPL BCP-MCNC: 2.1 G/DL (ref 3.5–5.2)
ALP SERPL-CCNC: 84 U/L (ref 55–135)
ALT SERPL W/O P-5'-P-CCNC: 10 U/L (ref 10–44)
ANION GAP SERPL CALC-SCNC: 12 MMOL/L (ref 8–16)
AST SERPL-CCNC: 11 U/L (ref 10–40)
BASOPHILS # BLD AUTO: 0.06 K/UL (ref 0–0.2)
BASOPHILS NFR BLD: 0.2 % (ref 0–1.9)
BILIRUB SERPL-MCNC: 0.3 MG/DL (ref 0.1–1)
BNP SERPL-MCNC: 421 PG/ML (ref 0–99)
BUN SERPL-MCNC: 24 MG/DL (ref 8–23)
CALCIUM SERPL-MCNC: 9.3 MG/DL (ref 8.7–10.5)
CHLORIDE SERPL-SCNC: 104 MMOL/L (ref 95–110)
CO2 SERPL-SCNC: 21 MMOL/L (ref 23–29)
CREAT SERPL-MCNC: 1.4 MG/DL (ref 0.5–1.4)
DIFFERENTIAL METHOD BLD: ABNORMAL
EOSINOPHIL # BLD AUTO: 0 K/UL (ref 0–0.5)
EOSINOPHIL NFR BLD: 0.1 % (ref 0–8)
ERYTHROCYTE [DISTWIDTH] IN BLOOD BY AUTOMATED COUNT: 14.1 % (ref 11.5–14.5)
EST. GFR  (NO RACE VARIABLE): 56 ML/MIN/1.73 M^2
GLUCOSE SERPL-MCNC: 106 MG/DL (ref 70–110)
HCT VFR BLD AUTO: 30 % (ref 40–54)
HGB BLD-MCNC: 9.9 G/DL (ref 14–18)
IMM GRANULOCYTES # BLD AUTO: 0.39 K/UL (ref 0–0.04)
IMM GRANULOCYTES NFR BLD AUTO: 1.4 % (ref 0–0.5)
INFLUENZA A, MOLECULAR: NEGATIVE
INFLUENZA B, MOLECULAR: NEGATIVE
LACTATE SERPL-SCNC: 1 MMOL/L (ref 0.5–2.2)
LYMPHOCYTES # BLD AUTO: 0.7 K/UL (ref 1–4.8)
LYMPHOCYTES NFR BLD: 2.5 % (ref 18–48)
MCH RBC QN AUTO: 29.3 PG (ref 27–31)
MCHC RBC AUTO-ENTMCNC: 33 G/DL (ref 32–36)
MCV RBC AUTO: 89 FL (ref 82–98)
MONOCYTES # BLD AUTO: 2.6 K/UL (ref 0.3–1)
MONOCYTES NFR BLD: 9.2 % (ref 4–15)
NEUTROPHILS # BLD AUTO: 24.3 K/UL (ref 1.8–7.7)
NEUTROPHILS NFR BLD: 86.6 % (ref 38–73)
NRBC BLD-RTO: 0 /100 WBC
PLATELET # BLD AUTO: 743 K/UL (ref 150–450)
PMV BLD AUTO: 8.9 FL (ref 9.2–12.9)
POTASSIUM SERPL-SCNC: 3.3 MMOL/L (ref 3.5–5.1)
PROT SERPL-MCNC: 6.7 G/DL (ref 6–8.4)
RBC # BLD AUTO: 3.38 M/UL (ref 4.6–6.2)
SARS-COV-2 RDRP RESP QL NAA+PROBE: NEGATIVE
SODIUM SERPL-SCNC: 137 MMOL/L (ref 136–145)
SPECIMEN SOURCE: NORMAL
TROPONIN I SERPL DL<=0.01 NG/ML-MCNC: 0.04 NG/ML (ref 0–0.03)
WBC # BLD AUTO: 28.06 K/UL (ref 3.9–12.7)

## 2024-04-14 PROCEDURE — 85025 COMPLETE CBC W/AUTO DIFF WBC: CPT | Performed by: SURGERY

## 2024-04-14 PROCEDURE — 93010 ELECTROCARDIOGRAM REPORT: CPT | Mod: ,,, | Performed by: INTERNAL MEDICINE

## 2024-04-14 PROCEDURE — 96361 HYDRATE IV INFUSION ADD-ON: CPT

## 2024-04-14 PROCEDURE — 87040 BLOOD CULTURE FOR BACTERIA: CPT | Mod: 59 | Performed by: SURGERY

## 2024-04-14 PROCEDURE — 93005 ELECTROCARDIOGRAM TRACING: CPT

## 2024-04-14 PROCEDURE — 96368 THER/DIAG CONCURRENT INF: CPT

## 2024-04-14 PROCEDURE — 83880 ASSAY OF NATRIURETIC PEPTIDE: CPT | Performed by: SURGERY

## 2024-04-14 PROCEDURE — 80053 COMPREHEN METABOLIC PANEL: CPT | Performed by: SURGERY

## 2024-04-14 PROCEDURE — 27000221 HC OXYGEN, UP TO 24 HOURS

## 2024-04-14 PROCEDURE — 25000003 PHARM REV CODE 250: Performed by: SURGERY

## 2024-04-14 PROCEDURE — 96365 THER/PROPH/DIAG IV INF INIT: CPT

## 2024-04-14 PROCEDURE — 63600175 PHARM REV CODE 636 W HCPCS: Performed by: SURGERY

## 2024-04-14 PROCEDURE — 99900035 HC TECH TIME PER 15 MIN (STAT)

## 2024-04-14 PROCEDURE — 96375 TX/PRO/DX INJ NEW DRUG ADDON: CPT

## 2024-04-14 PROCEDURE — 99285 EMERGENCY DEPT VISIT HI MDM: CPT | Mod: 25

## 2024-04-14 PROCEDURE — 25500020 PHARM REV CODE 255: Performed by: SURGERY

## 2024-04-14 PROCEDURE — 87502 INFLUENZA DNA AMP PROBE: CPT | Performed by: SURGERY

## 2024-04-14 PROCEDURE — 96366 THER/PROPH/DIAG IV INF ADDON: CPT

## 2024-04-14 PROCEDURE — 96372 THER/PROPH/DIAG INJ SC/IM: CPT | Mod: 59 | Performed by: SURGERY

## 2024-04-14 PROCEDURE — 84484 ASSAY OF TROPONIN QUANT: CPT | Performed by: SURGERY

## 2024-04-14 PROCEDURE — U0002 COVID-19 LAB TEST NON-CDC: HCPCS | Performed by: SURGERY

## 2024-04-14 PROCEDURE — 96376 TX/PRO/DX INJ SAME DRUG ADON: CPT

## 2024-04-14 PROCEDURE — 83605 ASSAY OF LACTIC ACID: CPT | Performed by: SURGERY

## 2024-04-14 RX ORDER — ENOXAPARIN SODIUM 100 MG/ML
80 INJECTION SUBCUTANEOUS
Status: COMPLETED | OUTPATIENT
Start: 2024-04-14 | End: 2024-04-14

## 2024-04-14 RX ORDER — DILTIAZEM HYDROCHLORIDE 5 MG/ML
20 INJECTION INTRAVENOUS
Status: COMPLETED | OUTPATIENT
Start: 2024-04-14 | End: 2024-04-14

## 2024-04-14 RX ORDER — MORPHINE SULFATE 2 MG/ML
4 INJECTION, SOLUTION INTRAMUSCULAR; INTRAVENOUS
Status: COMPLETED | OUTPATIENT
Start: 2024-04-14 | End: 2024-04-14

## 2024-04-14 RX ORDER — LORAZEPAM 1 MG/1
1 TABLET ORAL
Status: COMPLETED | OUTPATIENT
Start: 2024-04-14 | End: 2024-04-14

## 2024-04-14 RX ORDER — DILTIAZEM HCL 1 MG/ML
0-15 INJECTION, SOLUTION INTRAVENOUS
Status: COMPLETED | OUTPATIENT
Start: 2024-04-14 | End: 2024-04-14

## 2024-04-14 RX ORDER — LORAZEPAM 1 MG/1
1 TABLET ORAL
Status: DISCONTINUED | OUTPATIENT
Start: 2024-04-14 | End: 2024-04-14

## 2024-04-14 RX ADMIN — DILTIAZEM HYDROCHLORIDE 5 MG/HR: 5 INJECTION INTRAVENOUS at 12:04

## 2024-04-14 RX ADMIN — MORPHINE SULFATE 4 MG: 2 INJECTION, SOLUTION INTRAMUSCULAR; INTRAVENOUS at 03:04

## 2024-04-14 RX ADMIN — LORAZEPAM 1 MG: 1 TABLET ORAL at 05:04

## 2024-04-14 RX ADMIN — ENOXAPARIN SODIUM 80 MG: 80 INJECTION SUBCUTANEOUS at 12:04

## 2024-04-14 RX ADMIN — IOHEXOL 75 ML: 350 INJECTION, SOLUTION INTRAVENOUS at 02:04

## 2024-04-14 RX ADMIN — VANCOMYCIN HYDROCHLORIDE 1500 MG: 1.5 INJECTION, POWDER, LYOPHILIZED, FOR SOLUTION INTRAVENOUS at 01:04

## 2024-04-14 RX ADMIN — MORPHINE SULFATE 4 MG: 2 INJECTION, SOLUTION INTRAMUSCULAR; INTRAVENOUS at 12:04

## 2024-04-14 RX ADMIN — SODIUM CHLORIDE 1000 ML: 9 INJECTION, SOLUTION INTRAVENOUS at 11:04

## 2024-04-14 RX ADMIN — PIPERACILLIN AND TAZOBACTAM 4.5 G: 4; .5 INJECTION, POWDER, LYOPHILIZED, FOR SOLUTION INTRAVENOUS; PARENTERAL at 12:04

## 2024-04-14 RX ADMIN — DILTIAZEM HYDROCHLORIDE 20 MG: 5 INJECTION INTRAVENOUS at 11:04

## 2024-04-14 NOTE — ED PROVIDER NOTES
Encounter Date: 4/14/2024       History     Chief Complaint   Patient presents with    Chest Pain     Pt arrives to the ed with c/o chest pain and sob x 1 month.     Patient complains of intermittent chest pain and shortness of breath for the last week he is a heavy smoker    The history is provided by the patient.   Chest Pain  The current episode started several days ago. Duration of episode(s) is 1 week. Chest pain occurs intermittently. The chest pain is unchanged. The pain is associated with breathing. At its most intense, the chest pain is at 7/10. The chest pain is currently at 6/10. The quality of the pain is described as aching. The pain does not radiate. Chest pain is worsened by certain positions. Primary symptoms include shortness of breath, cough and palpitations. Pertinent negatives for primary symptoms include no fever and no syncope.   The shortness of breath began  more than 2 days ago. The shortness of breath developed gradually. The patient's medical history is significant for COPD and chronic lung disease.   The cough began 6 to 7 days ago. The cough is productive. The sputum is yellow. It is exacerbated by allergens.   The palpitations also occurred with shortness of breath.     Review of patient's allergies indicates:  No Known Allergies  Past Medical History:   Diagnosis Date    Hypertension     Multiple pelvic fractures      No past surgical history on file.  No family history on file.  Social History     Tobacco Use    Smoking status: Every Day     Current packs/day: 1.00     Types: Cigarettes   Substance Use Topics    Alcohol use: No     Review of Systems   Constitutional: Negative.  Negative for fever.   HENT: Negative.     Eyes: Negative.    Respiratory:  Positive for cough and shortness of breath.    Cardiovascular:  Positive for chest pain and palpitations. Negative for syncope.   Gastrointestinal: Negative.    Endocrine: Negative.    Genitourinary: Negative.    Musculoskeletal:  Negative.    Skin: Negative.    Allergic/Immunologic: Negative.    Neurological: Negative.    Hematological: Negative.    Psychiatric/Behavioral: Negative.         Physical Exam     Initial Vitals [04/14/24 1056]   BP Pulse Resp Temp SpO2   (!) 198/86 (!) 129 (!) 26 98.7 °F (37.1 °C) (!) 88 %      MAP       --         Physical Exam    Nursing note and vitals reviewed.  Constitutional: He appears well-nourished.   Thin cachectic mild tachypnea no distress   HENT:   Head: Normocephalic.   Eyes: Conjunctivae are normal.   Neck:   Normal range of motion.  Cardiovascular:            Tachycardic   Pulmonary/Chest: He has wheezes.   No breath sounds left base or left mid lung field   Abdominal: Abdomen is soft.   Musculoskeletal:         General: Edema present.      Cervical back: Normal range of motion.     Neurological: He is alert. GCS score is 15. GCS eye subscore is 4. GCS verbal subscore is 5. GCS motor subscore is 6.   Skin: Skin is warm.   Psychiatric: He has a normal mood and affect.         ED Course   Procedures  Labs Reviewed   CBC W/ AUTO DIFFERENTIAL - Abnormal; Notable for the following components:       Result Value    WBC 28.06 (*)     RBC 3.38 (*)     Hemoglobin 9.9 (*)     Hematocrit 30.0 (*)     Platelets 743 (*)     MPV 8.9 (*)     Immature Granulocytes 1.4 (*)     Gran # (ANC) 24.3 (*)     Immature Grans (Abs) 0.39 (*)     Lymph # 0.7 (*)     Mono # 2.6 (*)     Gran % 86.6 (*)     Lymph % 2.5 (*)     All other components within normal limits   COMPREHENSIVE METABOLIC PANEL - Abnormal; Notable for the following components:    Potassium 3.3 (*)     CO2 21 (*)     BUN 24 (*)     Albumin 2.1 (*)     eGFR 56 (*)     All other components within normal limits   B-TYPE NATRIURETIC PEPTIDE - Abnormal; Notable for the following components:     (*)     All other components within normal limits   TROPONIN I - Abnormal; Notable for the following components:    Troponin I 0.041 (*)     All other components  within normal limits   INFLUENZA A & B BY MOLECULAR   CULTURE, BLOOD   CULTURE, BLOOD   SARS-COV-2 RNA AMPLIFICATION, QUAL   LACTIC ACID, PLASMA   QUANTIFERON GOLD TB          Imaging Results              CT Chest With Contrast (Final result)  Result time 04/14/24 14:38:45      Final result by Micky Zapata MD (04/14/24 14:38:45)                   Impression:      1. Extensive dense consolidation within the posterior left upper lobe with superimposed rim enhancing cavitary component posteriorly concerning for intrapulmonary abscess formation versus superimposed necrotic neoplasm.  Additional superimposed lobular masslike rim enhancement along the left perihilar region, as above, potentially reflecting enlarged mediastinal lymph nodes versus underlying mass.  Consider further evaluation with nonemergent PET-CT and/or biopsy.  2. Spiculated 1.8 cm nodular opacity in the right upper lobe, new since 2015.  Mild S may reflect infectious or inflammatory process, metastatic disease cannot be excluded.  3. Interval enlargement of a 1.2 cm perifissural nodule along the right major fissure, previously 1.0 cm.  Additional 0.6 cm right perifissural nodule.  4. Mediastinal and left hilar lymphadenopathy, nonspecific and may be reflect reactive, with metastatic lymphadenopathy unable to be excluded.  5. Diffusely heterogeneous decreased attenuation of the patent parenchyma nonspecific and could relate to hepatic steatosis, with other infiltrative processes not excluded.  6. Mild left pleural effusion.  7. Additional findings, as above.      Electronically signed by: Micky Zapata  Date:    04/14/2024  Time:    14:38               Narrative:    EXAMINATION:  CT CHEST WITH CONTRAST    CLINICAL HISTORY:  Pneumonia, unresolved;    TECHNIQUE:  Low dose axial images, sagittal and coronal reformations were obtained from the thoracic inlet to the lung bases following the IV administration of 75 mL of Omnipaque 350.    COMPARISON:  Chest  radiograph 04/14/2024, CT chest 03/09/2015    FINDINGS:  Base of Neck: No significant abnormality.    Thoracic soft tissues: Unremarkable.    Aorta: Left-sided aortic arch.  No aneurysm.  Mild scattered calcific atherosclerosis.    Heart: Normal size.  Small pericardial effusion.    Pulmonary vasculature: Pulmonary arteries distribute normally.  There are four pulmonary veins.    Millie/Mediastinum: Multiple enlarged mediastinal and left hilar lymph nodes.  The largest is and aortopulmonary lymph node which measures 2.2 cm in short axis dimension (series 2, image 61).  Left hilar lymph node measures 1.5 cm in short axis dimension (series 2, image 73).    Airways: Trachea and proximal airways are patent.    Lungs/Pleura: Moderate centrilobular emphysematous changes with an upper lobe predominance.  Extensive dense consolidation within the posterior left upper lobe with a extensive interlobular and intra lobular septal thickening extending anterior superiorly.  There is superimpose rim enhancing cavitary component along the posterior left upper lobe measuring approximately 3.6 x 3.0 x 4.1 cm in AP by TV by CC dimensions (series 2, image 70), concerning for intrapulmonary abscess formation versus cavitary mass.  Additional lobular masslike rim enhancement along the left perihilar region (series 2, image 80-80), difficult to delineate from adjacent consolidation but measuring on the order of 7.6 x 3.0 cm, with encasement of the left superior lobar and lingular bronchi, worrisome for underlying neoplasm or hilar lymphadenopathy.  Mild left pleural effusion extending to the apex with mild left basilar atelectasis/consolidation.  Spiculated 1.8 cm solid opacity in the right upper lobe (series 4, image 322), new since the prior CT from 2015.  There is a 1.2 cm solid perifissural nodule along the right major fissure which has slightly increased in size since 2015 (series 4, image 371), previously measuring 1.0 cm.  There is an  additional 0.6 cm perifissural nodule along the right major fissure    Esophagus: Unremarkable.    Upper Abdomen: Diffusely heterogeneous decreased attenuation of the hepatic parenchyma, nonspecific and may reflect hepatic steatosis.  Small hiatal hernia.    Bones: No acute fracture. No suspicious lytic or sclerotic lesions.  Advanced degenerative change of the left glenohumeral joint.                                       X-Ray Chest AP Portable (Final result)  Result time 04/14/24 12:00:23      Final result by Micky Zapata MD (04/14/24 12:00:23)                   Impression:      Extensive consolidation throughout the left lung with a mid and lower lung zone predominance, as well as patchy interstitial opacities in the periphery of the right lung, nonspecific but concerning for pneumonia in the appropriate clinical setting.  Small left pleural effusion.      Electronically signed by: Micky Zapata  Date:    04/14/2024  Time:    12:00               Narrative:    EXAMINATION:  XR CHEST AP PORTABLE    CLINICAL HISTORY:  . Chest pain, unspecified    TECHNIQUE:  Single frontal portable view of the chest was performed.    COMPARISON:  CT chest 03/09/2015    FINDINGS:  Cardiomediastinal silhouette is within normal limits.Extensive interstitial and airspace opacification and throughout the left lung most pronounced in the mid and lower lung zones with suspected small left pleural effusion.  Additional scattered interstitial opacities along the periphery of the right mid and lower lung.  No significant right-sided pleural effusion.  No pneumothorax.  Bones are intact.                                    X-Rays:   Independently Interpreted Readings:   Chest X-Ray: Left mid lung and lower lung infiltrate with small pleural effusion   Other Readings:  CT scan shows left lung pneumonia possible abscess versus underlying tumor with mediastinal lymphadenopathy    Medications   sodium chloride 0.9% bolus 1,000 mL 1,000 mL (0 mLs  Intravenous Stopped 4/14/24 1227)   diltiaZEM injection 20 mg (20 mg Intravenous Given 4/14/24 1124)   morphine injection 4 mg (4 mg Intravenous Given 4/14/24 1200)   diltiaZEM 125 mg in D5W 125 mL infusion (15 mg/hr Intravenous Verify Only 4/14/24 1630)   vancomycin 1,500 mg in dextrose 5 % (D5W) 250 mL IVPB (Vial-Mate) (0 mg Intravenous Stopped 4/14/24 1456)   piperacillin-tazobactam (ZOSYN) 4.5 g in dextrose 5 % in water (D5W) 100 mL IVPB (MB+) (0 g Intravenous Stopped 4/14/24 1320)   enoxaparin injection 80 mg (80 mg Subcutaneous Given 4/14/24 1250)   iohexoL (OMNIPAQUE 350) injection 75 mL (75 mLs Intravenous Given 4/14/24 1410)   morphine injection 4 mg (4 mg Intravenous Given 4/14/24 1507)     Medical Decision Making  Patient admitted with shortness of breath and cough for a week he has tachycardia with a atrial fib RVR of over 150 he was administer IV fluids and Cardizem 20 mg in his heart rate decreased to 1 0 5 and then went back up to 122 and Cardizem drip was started chest x-ray showed a solid consolidation left mid lung and lower lobe with small pleural effusion CT scan was done which shows a possible abscess versus underlying tumor in the left lung he will be transferred to a nearby hospital because of a component of pulmonary and cardiology which we do not have available here at the time of transfer his oxygen was 99% on 2 L his blood pressure and pulse worse 1 48/95 and a heart rate of 105 he was given Lovenox 80 mg to prevent thrombo embolism    Amount and/or Complexity of Data Reviewed  Labs: ordered.  Radiology: ordered.    Risk  Prescription drug management.                                      Clinical Impression:  Final diagnoses:  [R07.9] Chest pain (Primary)  [J18.9] Pneumonia of left lung due to infectious organism, unspecified part of lung  [R09.02] Hypoxia  [I48.91] Atrial fibrillation with RVR          ED Disposition Condition    Transfer to Another Facility Stable                 GABRIELA Castro III, MD  04/14/24 1242       GABRIELA Castro III, MD  04/14/24 1523       GABRIELA Castro III, MD  04/14/24 1027

## 2024-04-15 PROBLEM — K92.1 MELENA: Status: ACTIVE | Noted: 2024-04-15

## 2024-04-15 PROBLEM — J15.9 COMMUNITY ACQUIRED BACTERIAL PNEUMONIA: Status: ACTIVE | Noted: 2024-04-14

## 2024-04-16 PROBLEM — R07.9 CHEST PAIN: Status: RESOLVED | Noted: 2024-04-14 | Resolved: 2024-04-16

## 2024-04-16 PROBLEM — K92.1 MELANOTIC STOOLS: Status: ACTIVE | Noted: 2024-04-14

## 2024-04-16 PROBLEM — K92.1 MELENA: Status: RESOLVED | Noted: 2024-04-15 | Resolved: 2024-04-16

## 2024-04-16 PROBLEM — J90 PLEURAL EFFUSION: Status: ACTIVE | Noted: 2024-04-16

## 2024-04-16 LAB
OHS QRS DURATION: 76 MS
OHS QTC CALCULATION: 438 MS

## 2024-04-18 PROBLEM — I73.00 RAYNAUD'S PHENOMENON: Status: ACTIVE | Noted: 2024-04-18

## 2024-04-19 LAB
BACTERIA BLD CULT: NORMAL
BACTERIA BLD CULT: NORMAL

## 2024-07-15 PROBLEM — J15.9 COMMUNITY ACQUIRED BACTERIAL PNEUMONIA: Status: RESOLVED | Noted: 2024-04-14 | Resolved: 2024-07-15

## 2025-04-04 ENCOUNTER — TELEPHONE (OUTPATIENT)
Dept: FAMILY MEDICINE | Facility: CLINIC | Age: 65
End: 2025-04-04
Payer: MEDICARE

## 2025-04-04 NOTE — TELEPHONE ENCOUNTER
----- Message from Sebas sent at 4/4/2025  8:26 AM CDT -----  Contact: Pt  .Type:  Needs Medical AdviceWho Called: PtSymptoms (please be specific): legs swollenWould the patient rather a call back or a response via [facility]?  Call [facility] Call Back Number: [phone] Information: pt. Would like to establish  care with the provider.